# Patient Record
Sex: FEMALE | Race: WHITE | Employment: FULL TIME | URBAN - METROPOLITAN AREA
[De-identification: names, ages, dates, MRNs, and addresses within clinical notes are randomized per-mention and may not be internally consistent; named-entity substitution may affect disease eponyms.]

---

## 2017-11-29 ENCOUNTER — HOSPITAL ENCOUNTER (OUTPATIENT)
Age: 22
Discharge: HOME | End: 2017-11-29

## 2017-11-29 VITALS
SYSTOLIC BLOOD PRESSURE: 99 MMHG | WEIGHT: 178 LBS | HEIGHT: 62 IN | OXYGEN SATURATION: 99 % | HEART RATE: 106 BPM | RESPIRATION RATE: 16 BRPM | DIASTOLIC BLOOD PRESSURE: 67 MMHG | BODY MASS INDEX: 32.76 KG/M2 | TEMPERATURE: 99 F

## 2017-11-29 DIAGNOSIS — S61.209A AVULSION OF FINGER, INITIAL ENCOUNTER: Primary | ICD-10-CM

## 2017-11-29 PROCEDURE — 90471 IMMUNIZATION ADMIN: CPT

## 2017-11-29 PROCEDURE — 99203 OFFICE O/P NEW LOW 30 MIN: CPT

## 2017-11-30 NOTE — ED PROVIDER NOTES
Patient presents with:  Laceration Abrasion (integumentary)      HPI:     Lorrie Iqbal is a 23year old female presents for a chief complaint of avulsion to the pad of the second digit of the left hand.   Patient reports she was trying to open something wi digit of the left hand. Patient avulsed the pad of  her finger earlier while trying to open something while cooking. Full range of motion to digit, bleeding controlled with Surgicel. Patient instructed to change bandage tomorrow.   Follow Up with her qian

## 2017-11-30 NOTE — ED INITIAL ASSESSMENT (HPI)
Pt reports that she cut the tip of her finger with a knife one hour ago. Pt states \"it won't stop bleeding. \" +avulsion to L 2nd digit.

## 2021-02-04 ENCOUNTER — OFFICE VISIT (OUTPATIENT)
Dept: FAMILY MEDICINE CLINIC | Facility: CLINIC | Age: 26
End: 2021-02-04
Payer: MEDICAID

## 2021-02-04 VITALS
WEIGHT: 187 LBS | SYSTOLIC BLOOD PRESSURE: 94 MMHG | TEMPERATURE: 97 F | BODY MASS INDEX: 31.16 KG/M2 | HEIGHT: 65 IN | HEART RATE: 77 BPM | DIASTOLIC BLOOD PRESSURE: 61 MMHG

## 2021-02-04 DIAGNOSIS — E55.9 VITAMIN D DEFICIENCY: ICD-10-CM

## 2021-02-04 DIAGNOSIS — Z3A.20 20 WEEKS GESTATION OF PREGNANCY: ICD-10-CM

## 2021-02-04 DIAGNOSIS — Z76.89 ENCOUNTER TO ESTABLISH CARE: Primary | ICD-10-CM

## 2021-02-04 PROBLEM — K80.20 GALLSTONE: Status: ACTIVE | Noted: 2018-02-15

## 2021-02-04 PROBLEM — O99.810 IMPAIRED GLUCOSE TOLERANCE IN PREGNANCY: Status: ACTIVE | Noted: 2020-04-20

## 2021-02-04 PROBLEM — E78.5 DYSLIPIDEMIA: Status: ACTIVE | Noted: 2018-02-08

## 2021-02-04 PROBLEM — Z34.90 PREGNANT: Status: ACTIVE | Noted: 2020-11-02

## 2021-02-04 PROBLEM — A74.9 CHLAMYDIAL INFECTION: Status: ACTIVE | Noted: 2021-02-04

## 2021-02-04 PROCEDURE — 3008F BODY MASS INDEX DOCD: CPT | Performed by: FAMILY MEDICINE

## 2021-02-04 PROCEDURE — 3078F DIAST BP <80 MM HG: CPT | Performed by: FAMILY MEDICINE

## 2021-02-04 PROCEDURE — 99203 OFFICE O/P NEW LOW 30 MIN: CPT | Performed by: FAMILY MEDICINE

## 2021-02-04 PROCEDURE — 3074F SYST BP LT 130 MM HG: CPT | Performed by: FAMILY MEDICINE

## 2021-02-04 NOTE — PROGRESS NOTES
HPI:   Amadou Hardy is a 30year old female who presents for an establish care visit and OB referral.  She is currently approximately 20 weeks patient per patient report. Her last menstrual period was in August 2020, exact date unknown.   Per patient she nasal congestion, sinus pain or ST  LUNGS: denies shortness of breath with exertion  CARDIOVASCULAR: denies chest pain on exertion  GI: denies abdominal pain,denies heartburn  : denies dysuria.   Thick vaginal discharge but no vaginal bleeding    EXAM:

## 2021-02-10 ENCOUNTER — TELEPHONE (OUTPATIENT)
Dept: OBGYN CLINIC | Facility: CLINIC | Age: 26
End: 2021-02-10

## 2021-02-10 NOTE — TELEPHONE ENCOUNTER
Called pt and informed her we received her records to start PN care. Pt stated she already found a different OB/GYN and established care with them. Pt stated she will  her records at  at [facility]. Pt provided with address.  Records placed at [facility]

## 2022-06-28 ENCOUNTER — TELEPHONE (OUTPATIENT)
Dept: OBGYN CLINIC | Facility: CLINIC | Age: 27
End: 2022-06-28

## 2022-06-28 NOTE — TELEPHONE ENCOUNTER
[facility]      Pt has records at the  to be picked up. She states she would pick them up back in 2/2021. Does pt still want records or can we destroy them?